# Patient Record
Sex: FEMALE | Race: BLACK OR AFRICAN AMERICAN
[De-identification: names, ages, dates, MRNs, and addresses within clinical notes are randomized per-mention and may not be internally consistent; named-entity substitution may affect disease eponyms.]

---
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
## 2018-06-28 ENCOUNTER — HOSPITAL ENCOUNTER (EMERGENCY)
Dept: HOSPITAL 68 - ERH | Age: 18
End: 2018-06-28
Payer: COMMERCIAL

## 2018-06-28 VITALS — SYSTOLIC BLOOD PRESSURE: 128 MMHG | DIASTOLIC BLOOD PRESSURE: 79 MMHG

## 2018-06-28 VITALS — WEIGHT: 137 LBS | BODY MASS INDEX: 23.39 KG/M2 | HEIGHT: 64 IN

## 2018-06-28 DIAGNOSIS — N39.0: Primary | ICD-10-CM

## 2018-06-28 NOTE — ED GI/GU/ABDOMINAL COMPLAINT
History of Present Illness
 
General
Chief Complaint: Female Urogenital Problems
Stated Complaint: URINATING BLOOD/PELVIC PAIN
Source: patient
Exam Limitations: no limitations
 
Vital Signs & Intake/Output
Vital Signs & Intake/Output
 Vital Signs
 
 
Date Time Temp Pulse Resp B/P B/P Pulse O2 O2 Flow FiO2
 
     Mean Ox Delivery Rate 
 
06/28 1836 98.0 94 16 128/79  99 Room Air  
 
06/28 1712       Room Air  
 
06/28 1622 98.1 96 20 133/78  98 Room Air  
 
 
 
Allergies
Coded Allergies:
NO KNOWN ALLERGIES (11/21/16)
 
Reconcile Medications
Cephalexin (Keflex) 500 MG CAPSULE   1 CAP PO BID uti
Multivitamin With Minerals (Hair, Skin & Nails) 1 EACH TABLET   1 TAB PO DAILY 
SUPPLEMENT  (Reported)
Phenazopyridine HCl (Pyridium) 100 MG TABLET   1 TAB PO TID uti
 
Triage Note:
PT TO ED C/O URINARY FREQUENCY, URGENCY AND LOWER
ABD CRAMPING SINCE YESTERDAY, WORSE TODAY.
LMP LAST WEEKEND.
Triage Nurses Notes Reviewed? yes
Pregnant? n
Is pt currently breastfeeding? No
Onset: Gradual
Duration: day(s):
Timing: recent history
Quality/Severity: burning, cramping
Location: suprapubic, urethral
HPI:
17yo female presents to ED complaining of dysuria, hematuria, urinary frequency,
and suprapubic pain x 2 days.  Patient states that she noticed blood on toilet 
paper when she wiped.  Patient reports her symptoms have been gradually 
worsening since onset.  Suprapubic pain is described as cramping.  Patient 
denies fevers, chills, back pain, diarrhea, change in vaginal discharge.  She is
sexually active, states she had STD testing at her OB/GYN's office recently.
(Maday Meehan)
 
Past History
 
Travel History
Traveled to [place] past 21 day No
 
Medical History
Any Pertinent Medical History? see below for history
Psychiatric: anxiety, depression
 
Surgical History
Surgical History: none
 
Psychosocial History
What is your primary language English
Tobacco Use: Never used
ETOH Use: denies use
Illicit Drug Use: denies illicit drug use
 
Family History
Hx Contributory? No
(Maday Meehan)
 
Review of Systems
 
Review of Systems
Constitutional:
Reports: no symptoms. 
EENTM:
Reports: no symptoms. 
Respiratory:
Reports: no symptoms. 
Cardiovascular:
Reports: no symptoms. 
GI:
Reports: see HPI. 
Genitourinary:
Reports: see HPI. 
Musculoskeletal:
Reports: no symptoms. 
Skin:
Reports: no symptoms. 
Neurological/Psychological:
Reports: no symptoms. 
Hematologic/Endocrine:
Reports: no symptoms. 
Immunologic/Allergic:
Reports: no symptoms. 
All Other Systems: Reviewed and Negative
(Alina FARIA,Maday Hopkins)
 
Physical Exam
 
Physical Exam
General Appearance: well developed/nourished, no apparent distress, alert, awake
Head: atraumatic, normal appearance
Eyes:
Bilateral: normal appearance. 
Ears, Nose, Throat, Mouth: hearing grossly normal
Neck: normal inspection, supple, full range of motion
Respiratory: normal breath sounds, no respiratory distress, lungs clear
Cardiovascular: regular rate/rhythm
Gastrointestinal: normal bowel sounds, soft, no organomegaly, suprapubic 
tenderness
Back: normal inspection, normal range of motion, no CVA tenderness
Extremities: normal range of motion
Neurologic/Psych: awake, alert, oriented x 3
Skin: intact, normal color, warm/dry
 
Core Measures
ACS in differential dx? No
Sepsis Present: No
Sepsis Focused Exam Completed? No
(Maday Meehan)
 
Progress
Differential Diagnosis: intrauterine pregnancy, kidney stone, PID/cervicitis, 
UTI/pyelo
Plan of Care:
 Orders
 
 
Procedure Date/time Status
 
CHLAMYDIA-GC DNA PROBE 06/28 1656 Active
 
URINE PREGNANCY 06/28 1623 Complete
 
URINALYSIS 06/28 1623 Complete
 
 
 Laboratory Tests
 
 
 
06/28/18 1656:
Urinalysis LIGHT  H, Urine Color YEL, Urine Clarity CLDY  H, Urine pH 7.0, Ur 
Specific Gravity 1.025, Urine Protein 100  H, Urine Ketones NEG, Urine Nitrite 
POS  H, Urine Bilirubin NEG, Urine Urobilinogen 1.0, Ur Leukocyte Esterase LARGE
 H, Ur Microscopic SEDIMENT EXAMINED, Urine RBC 25-50  H, Urine WBC > 75  H, Ur 
Epithelial Cells FEW, Urine Bacteria MANY  H, Urine Mucus FEW, Urine Hemoglobin 
MOD  H, Urine Glucose NEG, Urine Pregnancy Test NEGATIVE
 Microbiology
06/28 1656  URINE ROUT: GC DNA Probe - RECD
06/28 1656  URINE ROUT: Chlamydia DNA Probe (JAZMIN) - RECD
06/28 1623  URINE ROUT: GC DNA Probe - CAN
                Cancelled: UPDATED TO REFLECT SPECIMEN COLLECTED FOR PROBE
06/28 1623  URINE ROUT: Chlamydia DNA Probe (JAZMIN) - CAN
                Cancelled: UPDATED TO REFLECT SPECIMEN COLLECTED FOR PROBE
 
Patient's urine shows evidence of UTI.  Patient has no CVA tenderness, she is 
afebrile, low suspicion for pyelonephritis.  Patient treated with antibiotics 
for her urine infection.  Culture is pending.  Patient is nontoxic appearing, no
acute distress.  The patient agrees with the plan of care.
Initial ED EKG: none
(Maday Meehan)
 
Departure
 
Departure
Disposition: HOME OR SELF CARE
Condition: Stable
Clinical Impression
Primary Impression: UTI (urinary tract infection)
Qualifiers:  Urinary tract infection type: acute cystitis Hematuria presence: 
with hematuria Qualified Code: N30.01 - Acute cystitis with hematuria
Referrals:
Patient Has No Primary Care Dr (PCP/Family)
 
Additional Instructions:
Take full course of antibiotics. Take pyridium for your urinary pain. This may 
make your urine turn orange. Follow up with [facility]. Return with 
worsening symptoms or concerns.
 
Please note that there might be incidental findings in your evaluation that are 
unrelated to the current emergency department visit.  Please notify your primary
care doctor about this emergency department visit in order to obtain and review 
all of the testing performed so that these incidental findings can be monitored 
as needed.
 
If you had an x-ray performed, please understand that some fractures may not be 
seen on the initial set of x-rays.  If your symptoms persist you might need a 
repeat set of x-rays to check for such a fracture.
 
If you had a laceration evaluated, please understand that foreign bodies such as
glass or wood may not be visible to the naked eye or on plain x-rays.  If the 
wound becomes red, swollen, increasingly more painful or if there is any 
drainage from the wound, please have it reevaluated by a physician for the 
possibility of a retained foreign body.
 
If you're unable to follow up as outlined in the discharge instructions please 
return to the emergency department.
 
Thank you for choosing the [facility] Emergency Department for your care.
It was a pleasure to serve you today.
Departure Forms:
Customer Survey
General Discharge Information
Prescriptions:
Current Visit Scripts
Cephalexin (Keflex) 1 CAP PO BID  
     #14 CAP 
 
Phenazopyridine HCl (Pyridium) 1 TAB PO TID  
     #6 TAB 
 
 
(Maday Meehan)
 
PA/NP Co-Sign Statement
Statement:
ED Attending supervision documentation-
 
[] I saw and evaluated the patient. I have also reviewed all the pertinent lab 
results and diagnostic results. I agree with the findings and the plan of care 
as documented in the PA's/NP's documentation. 
 
[X] I have reviewed the ED Record and agree with the PA's/NP's documentation.
 
[] Additions or exceptions (if any) to the PAs/NP's note and plan are 
summarized below:
[]
 
(Bakari Gregory DO)